# Patient Record
Sex: FEMALE | Race: WHITE | NOT HISPANIC OR LATINO | URBAN - METROPOLITAN AREA
[De-identification: names, ages, dates, MRNs, and addresses within clinical notes are randomized per-mention and may not be internally consistent; named-entity substitution may affect disease eponyms.]

---

## 2017-01-09 ENCOUNTER — OUTPATIENT (OUTPATIENT)
Dept: OUTPATIENT SERVICES | Facility: HOSPITAL | Age: 47
LOS: 1 days | End: 2017-01-09
Payer: COMMERCIAL

## 2017-01-09 VITALS
RESPIRATION RATE: 16 BRPM | DIASTOLIC BLOOD PRESSURE: 78 MMHG | SYSTOLIC BLOOD PRESSURE: 125 MMHG | HEIGHT: 65 IN | WEIGHT: 154.1 LBS | TEMPERATURE: 98 F

## 2017-01-09 DIAGNOSIS — Z98.891 HISTORY OF UTERINE SCAR FROM PREVIOUS SURGERY: Chronic | ICD-10-CM

## 2017-01-09 DIAGNOSIS — K82.8 OTHER SPECIFIED DISEASES OF GALLBLADDER: ICD-10-CM

## 2017-01-09 DIAGNOSIS — E10.9 TYPE 1 DIABETES MELLITUS WITHOUT COMPLICATIONS: ICD-10-CM

## 2017-01-09 DIAGNOSIS — Z01.818 ENCOUNTER FOR OTHER PREPROCEDURAL EXAMINATION: ICD-10-CM

## 2017-01-09 DIAGNOSIS — K82.9 DISEASE OF GALLBLADDER, UNSPECIFIED: ICD-10-CM

## 2017-01-09 LAB
ALBUMIN SERPL ELPH-MCNC: 3.6 G/DL — SIGNIFICANT CHANGE UP (ref 3.3–5)
ALP SERPL-CCNC: 92 U/L — SIGNIFICANT CHANGE UP (ref 40–120)
ALT FLD-CCNC: 20 U/L — SIGNIFICANT CHANGE UP (ref 12–78)
ANION GAP SERPL CALC-SCNC: 7 MMOL/L — SIGNIFICANT CHANGE UP (ref 5–17)
AST SERPL-CCNC: 16 U/L — SIGNIFICANT CHANGE UP (ref 15–37)
BILIRUB SERPL-MCNC: 0.3 MG/DL — SIGNIFICANT CHANGE UP (ref 0.2–1.2)
BUN SERPL-MCNC: 9 MG/DL — SIGNIFICANT CHANGE UP (ref 7–23)
CALCIUM SERPL-MCNC: 8.6 MG/DL — SIGNIFICANT CHANGE UP (ref 8.5–10.1)
CHLORIDE SERPL-SCNC: 107 MMOL/L — SIGNIFICANT CHANGE UP (ref 96–108)
CO2 SERPL-SCNC: 28 MMOL/L — SIGNIFICANT CHANGE UP (ref 22–31)
CREAT SERPL-MCNC: 0.66 MG/DL — SIGNIFICANT CHANGE UP (ref 0.5–1.3)
GLUCOSE SERPL-MCNC: 94 MG/DL — SIGNIFICANT CHANGE UP (ref 70–99)
HBA1C BLD-MCNC: 7.9 % — HIGH (ref 4–5.6)
HCG SERPL-ACNC: <1 MIU/ML — SIGNIFICANT CHANGE UP
HCT VFR BLD CALC: 40.5 % — SIGNIFICANT CHANGE UP (ref 34.5–45)
HGB BLD-MCNC: 13.8 G/DL — SIGNIFICANT CHANGE UP (ref 11.5–15.5)
MCHC RBC-ENTMCNC: 30.2 PG — SIGNIFICANT CHANGE UP (ref 27–34)
MCHC RBC-ENTMCNC: 34.2 GM/DL — SIGNIFICANT CHANGE UP (ref 32–36)
MCV RBC AUTO: 88.3 FL — SIGNIFICANT CHANGE UP (ref 80–100)
PLATELET # BLD AUTO: 327 K/UL — SIGNIFICANT CHANGE UP (ref 150–400)
POTASSIUM SERPL-MCNC: 4 MMOL/L — SIGNIFICANT CHANGE UP (ref 3.5–5.3)
POTASSIUM SERPL-SCNC: 4 MMOL/L — SIGNIFICANT CHANGE UP (ref 3.5–5.3)
PROT SERPL-MCNC: 7.2 G/DL — SIGNIFICANT CHANGE UP (ref 6–8.3)
RBC # BLD: 4.59 M/UL — SIGNIFICANT CHANGE UP (ref 3.8–5.2)
RBC # FLD: 11.4 % — SIGNIFICANT CHANGE UP (ref 10.3–14.5)
SODIUM SERPL-SCNC: 142 MMOL/L — SIGNIFICANT CHANGE UP (ref 135–145)
WBC # BLD: 6.9 K/UL — SIGNIFICANT CHANGE UP (ref 3.8–10.5)
WBC # FLD AUTO: 6.9 K/UL — SIGNIFICANT CHANGE UP (ref 3.8–10.5)

## 2017-01-09 PROCEDURE — 93005 ELECTROCARDIOGRAM TRACING: CPT

## 2017-01-09 PROCEDURE — 93010 ELECTROCARDIOGRAM REPORT: CPT

## 2017-01-09 PROCEDURE — 85027 COMPLETE CBC AUTOMATED: CPT

## 2017-01-09 PROCEDURE — 84702 CHORIONIC GONADOTROPIN TEST: CPT

## 2017-01-09 PROCEDURE — 80053 COMPREHEN METABOLIC PANEL: CPT

## 2017-01-09 PROCEDURE — 86850 RBC ANTIBODY SCREEN: CPT

## 2017-01-09 PROCEDURE — 83036 HEMOGLOBIN GLYCOSYLATED A1C: CPT

## 2017-01-09 PROCEDURE — 86901 BLOOD TYPING SEROLOGIC RH(D): CPT

## 2017-01-09 PROCEDURE — 86900 BLOOD TYPING SEROLOGIC ABO: CPT

## 2017-01-09 PROCEDURE — G0463: CPT

## 2017-01-09 RX ORDER — INSULIN LISPRO 100/ML
0 VIAL (ML) SUBCUTANEOUS
Qty: 0 | Refills: 0 | COMMUNITY

## 2017-01-09 NOTE — H&P PST ADULT - PROBLEM SELECTOR PLAN 2
Finger stick on admit, monitor Blood glucose closely. pt on insulin pump, advised to follow instructions by Endocrinologist.

## 2017-01-09 NOTE — H&P PST ADULT - RS GEN PE MLT RESP DETAILS PC
good air movement/breath sounds equal/clear to auscultation bilaterally/airway patent/respirations non-labored/normal

## 2017-01-09 NOTE — H&P PST ADULT - HISTORY OF PRESENT ILLNESS
45 y/o female with h/o T1DM on insulin pump, known diabetic for about 30 yrs. have known to have gall bladder issues for couple of yrs, recent US showed some polyp. Denies N/V/D/C. Was advised to have laparoscopic cholecystectomy.

## 2017-01-09 NOTE — H&P PST ADULT - PMH
Type 1 diabetes mellitus Type 1 diabetes mellitus  1/19/16 Spoke with patient. Patient plans to reduce basal rates by 25% as per endocrine clearance then night before procedure. Insulin pump orders written. Note:  1 unit will lower this patient 50 mg/dL (ISF-Insulin sensitivity factor)

## 2017-01-09 NOTE — H&P PST ADULT - NSANTHOSAYNRD_GEN_A_CORE
No. BRADEN screening performed.  STOP BANG Legend: 0-2 = LOW Risk; 3-4 = INTERMEDIATE Risk; 5-8 = HIGH Risk

## 2017-01-09 NOTE — H&P PST ADULT - ASSESSMENT
47 y/o female with gall bladder disease , scheduled for laparoscopic cholecystectomy. pre op testing today. medical eval and endocrine eval advised.

## 2017-01-13 ENCOUNTER — APPOINTMENT (OUTPATIENT)
Dept: SURGERY | Facility: HOSPITAL | Age: 47
End: 2017-01-13

## 2017-01-19 RX ORDER — INSULIN LISPRO 100/ML
1 VIAL (ML) SUBCUTANEOUS
Qty: 0 | Refills: 0 | Status: DISCONTINUED | OUTPATIENT
Start: 2017-01-23 | End: 2017-02-07

## 2017-01-22 ENCOUNTER — RESULT REVIEW (OUTPATIENT)
Age: 47
End: 2017-01-22

## 2017-01-23 ENCOUNTER — OUTPATIENT (OUTPATIENT)
Dept: OUTPATIENT SERVICES | Facility: HOSPITAL | Age: 47
LOS: 1 days | Discharge: HOME | End: 2017-01-23
Payer: COMMERCIAL

## 2017-01-23 VITALS
DIASTOLIC BLOOD PRESSURE: 58 MMHG | HEART RATE: 69 BPM | RESPIRATION RATE: 16 BRPM | WEIGHT: 154.1 LBS | OXYGEN SATURATION: 98 % | TEMPERATURE: 98 F | SYSTOLIC BLOOD PRESSURE: 100 MMHG | HEIGHT: 65 IN

## 2017-01-23 VITALS
SYSTOLIC BLOOD PRESSURE: 101 MMHG | HEART RATE: 62 BPM | RESPIRATION RATE: 16 BRPM | OXYGEN SATURATION: 98 % | DIASTOLIC BLOOD PRESSURE: 54 MMHG

## 2017-01-23 DIAGNOSIS — K82.8 OTHER SPECIFIED DISEASES OF GALLBLADDER: ICD-10-CM

## 2017-01-23 DIAGNOSIS — Z98.891 HISTORY OF UTERINE SCAR FROM PREVIOUS SURGERY: Chronic | ICD-10-CM

## 2017-01-23 DIAGNOSIS — Z01.818 ENCOUNTER FOR OTHER PREPROCEDURAL EXAMINATION: ICD-10-CM

## 2017-01-23 LAB — HCG UR QL: NEGATIVE — SIGNIFICANT CHANGE UP

## 2017-01-23 PROCEDURE — 81025 URINE PREGNANCY TEST: CPT

## 2017-01-23 PROCEDURE — 88304 TISSUE EXAM BY PATHOLOGIST: CPT

## 2017-01-23 PROCEDURE — 88304 TISSUE EXAM BY PATHOLOGIST: CPT | Mod: 26

## 2017-01-23 PROCEDURE — 47562 LAPAROSCOPIC CHOLECYSTECTOMY: CPT

## 2017-01-23 RX ORDER — HYDROMORPHONE HYDROCHLORIDE 2 MG/ML
0.5 INJECTION INTRAMUSCULAR; INTRAVENOUS; SUBCUTANEOUS
Qty: 0 | Refills: 0 | Status: DISCONTINUED | OUTPATIENT
Start: 2017-01-23 | End: 2017-01-23

## 2017-01-23 RX ORDER — SODIUM CHLORIDE 9 MG/ML
1000 INJECTION, SOLUTION INTRAVENOUS
Qty: 0 | Refills: 0 | Status: DISCONTINUED | OUTPATIENT
Start: 2017-01-23 | End: 2017-01-23

## 2017-01-23 RX ORDER — INSULIN LISPRO 100/ML
0 VIAL (ML) SUBCUTANEOUS
Qty: 0 | Refills: 0 | COMMUNITY

## 2017-01-23 RX ORDER — ONDANSETRON 8 MG/1
4 TABLET, FILM COATED ORAL ONCE
Qty: 0 | Refills: 0 | Status: DISCONTINUED | OUTPATIENT
Start: 2017-01-23 | End: 2017-01-23

## 2017-01-23 RX ADMIN — HYDROMORPHONE HYDROCHLORIDE 0.5 MILLIGRAM(S): 2 INJECTION INTRAMUSCULAR; INTRAVENOUS; SUBCUTANEOUS at 09:51

## 2017-01-23 RX ADMIN — SODIUM CHLORIDE 100 MILLILITER(S): 9 INJECTION, SOLUTION INTRAVENOUS at 09:06

## 2017-01-23 RX ADMIN — HYDROMORPHONE HYDROCHLORIDE 0.5 MILLIGRAM(S): 2 INJECTION INTRAMUSCULAR; INTRAVENOUS; SUBCUTANEOUS at 09:14

## 2017-01-23 NOTE — BRIEF OPERATIVE NOTE - PROCEDURE
Umbilical hernia repair  01/23/2017    Active  BKECK  Laparoscopic cholecystectomy  01/23/2017    Active  BKECK

## 2017-01-23 NOTE — BRIEF OPERATIVE NOTE - PRE-OP DX
Gallbladder polyp  01/23/2017    Active  Alden Armstrong  Umbilical hernia without obstruction and without gangrene  01/23/2017    Active  Aldne Armstrong

## 2017-01-23 NOTE — ASU DISCHARGE PLAN (ADULT/PEDIATRIC). - MEDICATION SUMMARY - MEDICATIONS TO TAKE
I will START or STAY ON the medications listed below when I get home from the hospital:    oxyCODONE-acetaminophen 5mg-325mg oral tablet  -- 1 tab(s) by mouth every 6 hours MDD:4  -- Caution federal law prohibits the transfer of this drug to any person other  than the person for whom it was prescribed.  May cause drowsiness.  Alcohol may intensify this effect.  Use care when operating dangerous machinery.  This prescription cannot be refilled.  This product contains acetaminophen.  Do not use  with any other product containing acetaminophen to prevent possible liver damage.  Using more of this medication than prescribed may cause serious breathing problems.    -- Indication: For pain    HumaLOG Cartridge  --  subcutaneous   Insulin PUmp  -- Indication: For home med I will START or STAY ON the medications listed below when I get home from the hospital:    oxyCODONE-acetaminophen 5mg-325mg oral tablet  -- 1 tab(s) by mouth every 6 hours MDD:4  -- Caution federal law prohibits the transfer of this drug to any person other  than the person for whom it was prescribed.  May cause drowsiness.  Alcohol may intensify this effect.  Use care when operating dangerous machinery.  This prescription cannot be refilled.  This product contains acetaminophen.  Do not use  with any other product containing acetaminophen to prevent possible liver damage.  Using more of this medication than prescribed may cause serious breathing problems.    -- Indication: For pain    HumaLOG Cartridge  --  subcutaneous   Insulin PUmp  -- Indication: For home med    Cleocin HCl 300 mg oral capsule  -- 1 cap(s) by mouth every 6 hours  -- Finish all this medication unless otherwise directed by prescriber.  Medication should be taken with plenty of water.    -- Indication: For antibiotics

## 2017-01-23 NOTE — BRIEF OPERATIVE NOTE - POST-OP DX
Gallbladder polyp  01/23/2017    Active  Alden Armstrong  Umbilical hernia without obstruction and without gangrene  01/23/2017    Active  Alden Armstrong

## 2017-01-23 NOTE — ASU DISCHARGE PLAN (ADULT/PEDIATRIC). - NOTIFY
Fever greater than 101 Persistent Nausea and Vomiting/Swelling that continues/Unable to Urinate/Bleeding that does not stop/Fever greater than 101/Inability to Tolerate Liquids or Foods

## 2017-01-24 LAB — SURGICAL PATHOLOGY FINAL REPORT - CH: SIGNIFICANT CHANGE UP

## 2017-01-26 DIAGNOSIS — K80.10 CALCULUS OF GALLBLADDER WITH CHRONIC CHOLECYSTITIS WITHOUT OBSTRUCTION: ICD-10-CM

## 2017-01-26 DIAGNOSIS — Z96.41 PRESENCE OF INSULIN PUMP (EXTERNAL) (INTERNAL): ICD-10-CM

## 2017-01-26 DIAGNOSIS — K82.4 CHOLESTEROLOSIS OF GALLBLADDER: ICD-10-CM

## 2017-01-26 DIAGNOSIS — Z79.4 LONG TERM (CURRENT) USE OF INSULIN: ICD-10-CM

## 2017-01-26 DIAGNOSIS — E10.9 TYPE 1 DIABETES MELLITUS WITHOUT COMPLICATIONS: ICD-10-CM

## 2017-06-21 PROBLEM — E10.9 TYPE 1 DIABETES MELLITUS WITHOUT COMPLICATIONS: Chronic | Status: ACTIVE | Noted: 2017-01-09

## 2017-06-27 ENCOUNTER — APPOINTMENT (OUTPATIENT)
Dept: OBGYN | Facility: CLINIC | Age: 47
End: 2017-06-27

## 2017-06-27 VITALS
BODY MASS INDEX: 24.99 KG/M2 | HEIGHT: 65 IN | DIASTOLIC BLOOD PRESSURE: 82 MMHG | WEIGHT: 150 LBS | SYSTOLIC BLOOD PRESSURE: 126 MMHG

## 2017-06-27 DIAGNOSIS — Z01.419 ENCOUNTER FOR GYNECOLOGICAL EXAMINATION (GENERAL) (ROUTINE) W/OUT ABNORMAL FINDINGS: ICD-10-CM

## 2017-06-28 ENCOUNTER — APPOINTMENT (OUTPATIENT)
Dept: OBGYN | Facility: CLINIC | Age: 47
End: 2017-06-28

## 2017-06-29 LAB — HPV HIGH+LOW RISK DNA PNL CVX: NEGATIVE

## 2017-07-02 LAB — CYTOLOGY CVX/VAG DOC THIN PREP: NORMAL

## 2017-07-07 ENCOUNTER — APPOINTMENT (OUTPATIENT)
Dept: OBGYN | Facility: CLINIC | Age: 47
End: 2017-07-07

## 2018-12-02 NOTE — ASU DISCHARGE PLAN (ADULT/PEDIATRIC). - ITEMS TO FOLLOWUP WITH YOUR PHYSICIAN'S
pr cleared to go to  as per MD Chu. rpt given to ZBIGNIEW Talley. pt is calm and cooperative at this time. awaiting further orders Will continue to monitor the pt call for appt in 1 week

## 2020-09-24 ENCOUNTER — APPOINTMENT (OUTPATIENT)
Dept: OTOLARYNGOLOGY | Facility: CLINIC | Age: 50
End: 2020-09-24
Payer: COMMERCIAL

## 2020-09-24 PROCEDURE — 92585: CPT

## 2020-09-29 ENCOUNTER — RECORD ABSTRACTING (OUTPATIENT)
Age: 50
End: 2020-09-29

## 2020-09-29 DIAGNOSIS — E10.9 TYPE 1 DIABETES MELLITUS W/OUT COMPLICATIONS: ICD-10-CM

## 2020-09-29 DIAGNOSIS — M26.621 ARTHRALGIA OF RIGHT TEMPOROMANDIBULAR JOINT: ICD-10-CM

## 2020-10-01 ENCOUNTER — APPOINTMENT (OUTPATIENT)
Dept: OTOLARYNGOLOGY | Facility: CLINIC | Age: 50
End: 2020-10-01

## 2020-10-06 ENCOUNTER — APPOINTMENT (OUTPATIENT)
Dept: OTOLARYNGOLOGY | Facility: CLINIC | Age: 50
End: 2020-10-06
Payer: COMMERCIAL

## 2020-10-06 VITALS
BODY MASS INDEX: 24.99 KG/M2 | DIASTOLIC BLOOD PRESSURE: 74 MMHG | SYSTOLIC BLOOD PRESSURE: 110 MMHG | WEIGHT: 150 LBS | HEIGHT: 65 IN | TEMPERATURE: 97.1 F | HEART RATE: 75 BPM

## 2020-10-06 DIAGNOSIS — R51.9 HEADACHE, UNSPECIFIED: ICD-10-CM

## 2020-10-06 DIAGNOSIS — M26.609 UNSPECIFIED TEMPOROMANDIBULAR JOINT DISORDER: ICD-10-CM

## 2020-10-06 PROCEDURE — 99213 OFFICE O/P EST LOW 20 MIN: CPT

## 2020-10-06 NOTE — HISTORY OF PRESENT ILLNESS
[de-identified] : 50 yr old female w aymmetrical SNHL AS worse. ABR abnl AD.  All summer had HA, MRI WNL as per pt. Now on topomax, HA are improved.\par -tinnitus, dizzy\par +hx TMJ

## 2020-10-06 NOTE — ASSESSMENT
[FreeTextEntry1] : HA due to migraine, improving w Topomax\par No MRI cause for asym HL or abnl ABR\par warm soaks, soft diet, NSAID, DDS eval for TMJ\par f/u 6 months

## 2021-01-06 ENCOUNTER — TRANSCRIPTION ENCOUNTER (OUTPATIENT)
Age: 51
End: 2021-01-06

## 2021-04-12 ENCOUNTER — APPOINTMENT (OUTPATIENT)
Dept: OTOLARYNGOLOGY | Facility: CLINIC | Age: 51
End: 2021-04-12
Payer: COMMERCIAL

## 2021-04-12 VITALS
WEIGHT: 140 LBS | TEMPERATURE: 97.2 F | HEIGHT: 65 IN | BODY MASS INDEX: 23.32 KG/M2 | SYSTOLIC BLOOD PRESSURE: 89 MMHG | DIASTOLIC BLOOD PRESSURE: 56 MMHG | HEART RATE: 70 BPM

## 2021-04-12 DIAGNOSIS — H90.42 SENSORINEURAL HEARING LOSS, UNILATERAL, LEFT EAR, WITH UNRESTRICTED HEARING ON THE CONTRALATERAL SIDE: ICD-10-CM

## 2021-04-12 PROCEDURE — 99213 OFFICE O/P EST LOW 20 MIN: CPT

## 2021-04-12 PROCEDURE — 92550 TYMPANOMETRY & REFLEX THRESH: CPT

## 2021-04-12 PROCEDURE — 99072 ADDL SUPL MATRL&STAF TM PHE: CPT

## 2021-04-12 PROCEDURE — 92557 COMPREHENSIVE HEARING TEST: CPT

## 2021-04-12 NOTE — REVIEW OF SYSTEMS
[Seasonal Allergies] : seasonal allergies [As Noted in HPI] : as noted in HPI [Throat Dryness] : throat dryness [Negative] : Nasal

## 2021-04-12 NOTE — ASSESSMENT
[FreeTextEntry1] : no subjective change in hearing\par \par  WNL AU with mild SN notch 3-4KHz AS w type A AU, no change from  2020\par annual audio

## 2021-04-12 NOTE — HISTORY OF PRESENT ILLNESS
[de-identified] : 50 yr old female w aymmetrical SNHL AS worse. ABR abnl AD.  All summer had HA, MRI WNL as per pt. Now on topomax, HA are improved.\par -tinnitus, dizzy\par +hx TMJ\par \par new dx of GERD tx w pantoprazole bid

## 2021-06-23 NOTE — ASU PATIENT PROFILE, ADULT - TEACHING/LEARNING FACTORS IMPACT ABILITY TO LEARN
[email or web address]- Please rest for 24 hours, no strenuous activity. Resume normal activities after 24 hours.  - No soaking in bath tubs, hot tubs, or swimming pools for 24 hours  - You may shower in the morning.  - Keep your band aid in place for 24 hours.    NO DRIVING FOR 4 hours post procedure.    - Immediately following the procedure, it is possible that your legs may feel shaky or weak. These sensations are temporary. Please alert the staff if this occurs,as we do not want you to fall. Even though you may feel strong enough, the numbing medicine is much stronger and you risk falling and injuring yourself further.  - Tenderness at the site of the injection is possible but usually minimal. If the pain is too bothersome, take anti-inflammatory medications or acetaminophen (both sold over-the- counter). A cold compress can be used for 15 minutes intervals 1-2 hours as needed. Do not use heat for the first 24 hours.  - If you were asked to hold certain medications for the procedure, you may restart today, unless advised otherwise.    If you experience:    ~Fever, chills, swelling,or drainage from the injection site    ~Severe arm/leg weakness, severe headache that is worse when standing up    ~New/sudden changes in urination/bowel control(incontinence)    ~Worsening pain several days after the procedure   You should call the clinic immediately or go to the nearest Emergency Department for evaluation    ++If unable to reach your physician,call 911 or go to the nearest hospital Emergency Department++    You were given \"homework\" today.  Please use handout and call clinic as instructed with your pain levels. [phone]  #2.   none

## 2021-12-31 ENCOUNTER — TRANSCRIPTION ENCOUNTER (OUTPATIENT)
Age: 51
End: 2021-12-31

## 2022-04-11 ENCOUNTER — APPOINTMENT (OUTPATIENT)
Dept: OTOLARYNGOLOGY | Facility: CLINIC | Age: 52
End: 2022-04-11

## 2022-07-20 ENCOUNTER — NON-APPOINTMENT (OUTPATIENT)
Age: 52
End: 2022-07-20

## 2023-04-15 ENCOUNTER — NON-APPOINTMENT (OUTPATIENT)
Age: 53
End: 2023-04-15

## 2024-05-27 ENCOUNTER — EMERGENCY (EMERGENCY)
Facility: HOSPITAL | Age: 54
LOS: 1 days | Discharge: HOME | End: 2024-05-27
Attending: EMERGENCY MEDICINE
Payer: COMMERCIAL

## 2024-05-27 ENCOUNTER — TRANSCRIPTION ENCOUNTER (OUTPATIENT)
Age: 54
End: 2024-05-27

## 2024-05-27 VITALS
HEART RATE: 73 BPM | WEIGHT: 160.06 LBS | HEIGHT: 65 IN | DIASTOLIC BLOOD PRESSURE: 74 MMHG | RESPIRATION RATE: 17 BRPM | SYSTOLIC BLOOD PRESSURE: 109 MMHG | OXYGEN SATURATION: 99 % | TEMPERATURE: 98 F

## 2024-05-27 DIAGNOSIS — Z98.891 HISTORY OF UTERINE SCAR FROM PREVIOUS SURGERY: Chronic | ICD-10-CM

## 2024-05-27 LAB
ADD ON TEST-SPECIMEN IN LAB: SIGNIFICANT CHANGE UP
ADD ON TEST-SPECIMEN IN LAB: SIGNIFICANT CHANGE UP
ALBUMIN SERPL ELPH-MCNC: 4.3 G/DL — SIGNIFICANT CHANGE UP (ref 3.3–5)
ALP SERPL-CCNC: 66 U/L — SIGNIFICANT CHANGE UP (ref 40–120)
ALT FLD-CCNC: 25 U/L — SIGNIFICANT CHANGE UP (ref 10–45)
ANION GAP SERPL CALC-SCNC: 10 MMOL/L — SIGNIFICANT CHANGE UP (ref 5–17)
ANION GAP SERPL CALC-SCNC: 10 MMOL/L — SIGNIFICANT CHANGE UP (ref 5–17)
ANION GAP SERPL CALC-SCNC: 8 MMOL/L — SIGNIFICANT CHANGE UP (ref 5–17)
APTT BLD: 30.7 SEC — SIGNIFICANT CHANGE UP (ref 24.5–35.6)
AST SERPL-CCNC: 57 U/L — HIGH (ref 10–40)
BASE EXCESS BLDV CALC-SCNC: -0.8 MMOL/L — SIGNIFICANT CHANGE UP (ref -2–3)
BASOPHILS # BLD AUTO: 0.04 K/UL — SIGNIFICANT CHANGE UP (ref 0–0.2)
BASOPHILS NFR BLD AUTO: 0.7 % — SIGNIFICANT CHANGE UP (ref 0–2)
BILIRUB SERPL-MCNC: 0.6 MG/DL — SIGNIFICANT CHANGE UP (ref 0.2–1.2)
BUN SERPL-MCNC: 13 MG/DL — SIGNIFICANT CHANGE UP (ref 7–23)
BUN SERPL-MCNC: 13 MG/DL — SIGNIFICANT CHANGE UP (ref 7–23)
BUN SERPL-MCNC: 16 MG/DL — SIGNIFICANT CHANGE UP (ref 7–23)
CA-I SERPL-SCNC: 1.14 MMOL/L — LOW (ref 1.15–1.33)
CALCIUM SERPL-MCNC: 9.1 MG/DL — SIGNIFICANT CHANGE UP (ref 8.4–10.5)
CALCIUM SERPL-MCNC: 9.2 MG/DL — SIGNIFICANT CHANGE UP (ref 8.4–10.5)
CALCIUM SERPL-MCNC: 9.3 MG/DL — SIGNIFICANT CHANGE UP (ref 8.4–10.5)
CHLORIDE BLDV-SCNC: 106 MMOL/L — SIGNIFICANT CHANGE UP (ref 96–108)
CHLORIDE SERPL-SCNC: 104 MMOL/L — SIGNIFICANT CHANGE UP (ref 96–108)
CHLORIDE SERPL-SCNC: 108 MMOL/L — SIGNIFICANT CHANGE UP (ref 96–108)
CHLORIDE SERPL-SCNC: 109 MMOL/L — HIGH (ref 96–108)
CO2 BLDV-SCNC: 26 MMOL/L — SIGNIFICANT CHANGE UP (ref 22–26)
CO2 SERPL-SCNC: 21 MMOL/L — LOW (ref 22–31)
CO2 SERPL-SCNC: 21 MMOL/L — LOW (ref 22–31)
CO2 SERPL-SCNC: 23 MMOL/L — SIGNIFICANT CHANGE UP (ref 22–31)
CREAT SERPL-MCNC: 0.73 MG/DL — SIGNIFICANT CHANGE UP (ref 0.5–1.3)
CREAT SERPL-MCNC: 0.74 MG/DL — SIGNIFICANT CHANGE UP (ref 0.5–1.3)
CREAT SERPL-MCNC: 0.78 MG/DL — SIGNIFICANT CHANGE UP (ref 0.5–1.3)
EGFR: 100 ML/MIN/1.73M2 — SIGNIFICANT CHANGE UP
EGFR: 92 ML/MIN/1.73M2 — SIGNIFICANT CHANGE UP
EGFR: 98 ML/MIN/1.73M2 — SIGNIFICANT CHANGE UP
EOSINOPHIL # BLD AUTO: 0.07 K/UL — SIGNIFICANT CHANGE UP (ref 0–0.5)
EOSINOPHIL NFR BLD AUTO: 1.2 % — SIGNIFICANT CHANGE UP (ref 0–6)
GAS PNL BLDV: 134 MMOL/L — LOW (ref 136–145)
GAS PNL BLDV: SIGNIFICANT CHANGE UP
GAS PNL BLDV: SIGNIFICANT CHANGE UP
GLUCOSE BLDV-MCNC: 96 MG/DL — SIGNIFICANT CHANGE UP (ref 70–99)
GLUCOSE SERPL-MCNC: 102 MG/DL — HIGH (ref 70–99)
GLUCOSE SERPL-MCNC: 105 MG/DL — HIGH (ref 70–99)
GLUCOSE SERPL-MCNC: 129 MG/DL — HIGH (ref 70–99)
HCG SERPL-ACNC: <2 MIU/ML — SIGNIFICANT CHANGE UP
HCO3 BLDV-SCNC: 25 MMOL/L — SIGNIFICANT CHANGE UP (ref 22–29)
HCT VFR BLD CALC: 42.1 % — SIGNIFICANT CHANGE UP (ref 34.5–45)
HCT VFR BLDA CALC: 44 % — SIGNIFICANT CHANGE UP (ref 34.5–46.5)
HGB BLD CALC-MCNC: 14.5 G/DL — SIGNIFICANT CHANGE UP (ref 11.7–16.1)
HGB BLD-MCNC: 14.1 G/DL — SIGNIFICANT CHANGE UP (ref 11.5–15.5)
IMM GRANULOCYTES NFR BLD AUTO: 0.4 % — SIGNIFICANT CHANGE UP (ref 0–0.9)
INR BLD: 0.88 RATIO — SIGNIFICANT CHANGE UP (ref 0.85–1.18)
LACTATE BLDV-MCNC: 1.3 MMOL/L — SIGNIFICANT CHANGE UP (ref 0.5–2)
LYMPHOCYTES # BLD AUTO: 1.91 K/UL — SIGNIFICANT CHANGE UP (ref 1–3.3)
LYMPHOCYTES # BLD AUTO: 33.5 % — SIGNIFICANT CHANGE UP (ref 13–44)
MCHC RBC-ENTMCNC: 30 PG — SIGNIFICANT CHANGE UP (ref 27–34)
MCHC RBC-ENTMCNC: 33.5 GM/DL — SIGNIFICANT CHANGE UP (ref 32–36)
MCV RBC AUTO: 89.6 FL — SIGNIFICANT CHANGE UP (ref 80–100)
MONOCYTES # BLD AUTO: 0.41 K/UL — SIGNIFICANT CHANGE UP (ref 0–0.9)
MONOCYTES NFR BLD AUTO: 7.2 % — SIGNIFICANT CHANGE UP (ref 2–14)
NEUTROPHILS # BLD AUTO: 3.25 K/UL — SIGNIFICANT CHANGE UP (ref 1.8–7.4)
NEUTROPHILS NFR BLD AUTO: 57 % — SIGNIFICANT CHANGE UP (ref 43–77)
NRBC # BLD: 0 /100 WBCS — SIGNIFICANT CHANGE UP (ref 0–0)
NT-PROBNP SERPL-SCNC: 230 PG/ML — SIGNIFICANT CHANGE UP (ref 0–300)
PCO2 BLDV: 44 MMHG — HIGH (ref 39–42)
PH BLDV: 7.36 — SIGNIFICANT CHANGE UP (ref 7.32–7.43)
PLATELET # BLD AUTO: 311 K/UL — SIGNIFICANT CHANGE UP (ref 150–400)
PO2 BLDV: 35 MMHG — SIGNIFICANT CHANGE UP (ref 25–45)
POTASSIUM BLDV-SCNC: 6.8 MMOL/L — CRITICAL HIGH (ref 3.5–5.1)
POTASSIUM SERPL-MCNC: 3.9 MMOL/L — SIGNIFICANT CHANGE UP (ref 3.5–5.3)
POTASSIUM SERPL-MCNC: 5.1 MMOL/L — SIGNIFICANT CHANGE UP (ref 3.5–5.3)
POTASSIUM SERPL-MCNC: 6.2 MMOL/L — CRITICAL HIGH (ref 3.5–5.3)
POTASSIUM SERPL-SCNC: 3.9 MMOL/L — SIGNIFICANT CHANGE UP (ref 3.5–5.3)
POTASSIUM SERPL-SCNC: 5.1 MMOL/L — SIGNIFICANT CHANGE UP (ref 3.5–5.3)
POTASSIUM SERPL-SCNC: 6.2 MMOL/L — CRITICAL HIGH (ref 3.5–5.3)
PROT SERPL-MCNC: 7.5 G/DL — SIGNIFICANT CHANGE UP (ref 6–8.3)
PROTHROM AB SERPL-ACNC: 9.7 SEC — SIGNIFICANT CHANGE UP (ref 9.5–13)
RBC # BLD: 4.7 M/UL — SIGNIFICANT CHANGE UP (ref 3.8–5.2)
RBC # FLD: 12.6 % — SIGNIFICANT CHANGE UP (ref 10.3–14.5)
SAO2 % BLDV: 57.5 % — LOW (ref 67–88)
SODIUM SERPL-SCNC: 135 MMOL/L — SIGNIFICANT CHANGE UP (ref 135–145)
SODIUM SERPL-SCNC: 139 MMOL/L — SIGNIFICANT CHANGE UP (ref 135–145)
SODIUM SERPL-SCNC: 140 MMOL/L — SIGNIFICANT CHANGE UP (ref 135–145)
TROPONIN T, HIGH SENSITIVITY RESULT: <6 NG/L — SIGNIFICANT CHANGE UP (ref 0–51)
WBC # BLD: 5.7 K/UL — SIGNIFICANT CHANGE UP (ref 3.8–10.5)
WBC # FLD AUTO: 5.7 K/UL — SIGNIFICANT CHANGE UP (ref 3.8–10.5)

## 2024-05-27 PROCEDURE — 99223 1ST HOSP IP/OBS HIGH 75: CPT

## 2024-05-27 PROCEDURE — 71046 X-RAY EXAM CHEST 2 VIEWS: CPT | Mod: 26

## 2024-05-27 NOTE — ED PROVIDER NOTE - OBJECTIVE STATEMENT
53-year-old female with past medical history of diabetes on insulin, presenting to the ED with shortness of breath for the past week and chest comfort that started today.  Patient states that she has been having exertional dyspnea has been going on for a week.  Patient also reports chest tightness that started today.  Denies any nausea, vomiting, fever, chills, cough, abdominal pain, urinary complaints.  Patient does have family history of MI.  Denies any smoking.

## 2024-05-27 NOTE — ED CDU PROVIDER DISPOSITION NOTE - PATIENT PORTAL LINK FT
You can access the FollowMyHealth Patient Portal offered by [facility] by registering at the following website: [email or web address]. By joining [facility]’s FollowMyHealth portal, you will also be able to view your health information using other applications (apps) compatible with our system.

## 2024-05-27 NOTE — ED PROVIDER NOTE - PROGRESS NOTE DETAILS
MD Viola (PGY-2) patient's labs and imaging reviewed.  No acute findings.  Plan to have patient be placed in CDU for echo and stress.

## 2024-05-27 NOTE — ED ADULT TRIAGE NOTE - CHIEF COMPLAINT QUOTE
pt c/o sob at rest and exertion x 1 week and chest discomfort since today  pt denies additional symptoms

## 2024-05-27 NOTE — ED CDU PROVIDER INITIAL DAY NOTE - CLINICAL SUMMARY MEDICAL DECISION MAKING FREE TEXT BOX
INGRID Oliver MD: 54 yo F with a PMH of IDDM on pump p/w shortness of breath for the past week and chest comfort that started today.  Patient states that she has been having exertional dyspnea has been going on for a week. Today she began to experience diffuse chest tightness while at rest. Concerned that her father has had prior stents. Pt herself has no hx of CAD/MI. Denies nausea, vomiting, fever, chills, palpitations, leg swelling, calf pain, headache, dizziness. Pt sent to CDU for CTCA, echo, cardiac monitoring.

## 2024-05-27 NOTE — ED CDU PROVIDER DISPOSITION NOTE - CARE PROVIDER_API CALL
Anibal Herrera  Cardiovascular Disease  [address], [address]  [place] [address]  Phone: [phone]  Fax: [phone]  Follow Up Time: 4-6 Days

## 2024-05-27 NOTE — ED CDU PROVIDER DISPOSITION NOTE - NSFOLLOWUPINSTRUCTIONS_ED_ALL_ED_FT
Please make sure to follow up with your primary care doctor within 1-2 days and with the CARDIOLOGY specialist. The information for follow up can be found below. Bring a copy of all of your results with you to your follow up appointments.   Return to the ER as discussed if you develop any new or worsening symptoms.       CARDIOLOGY: Follow-up with your primary care doctor within the next 2-3 days for re-evaluation. Please bring a copy of all results with you to your appointments.     Additionally recommend follow-up with your cardiologist or cardiologist Dr. Herrera within 1 week of discharge.  Please call to schedule appointment.    Anibal Herrera  Cardiovascular Disease  [address], [address]  [place] [address]  Phone: [phone]  Follow Up Time: 4-6 Days    Additionally continue outpatient follow-up with your endocrinologist for continued management regarding your insulin pump.  Resume insulin pump on discharge as previously advised/prescribed.    Rest, stay hydrated.  Continue current home medications as previously prescribed.    Return the Emergency Department immediately if you develop any new/worsening symptoms including but not limited to worsening chest pain/difficulty breathing, vomiting, abdominal pain, weakness or any other concerns.

## 2024-05-27 NOTE — ED CDU PROVIDER INITIAL DAY NOTE - OBJECTIVE STATEMENT
52 yo F with a PMH of IDDM on pump p/w shortness of breath for the past week and chest comfort that started today.  Patient states that she has been having exertional dyspnea has been going on for a week. Today she began to experience diffuse chest tightness while at rest. Concerned that her father has had prior stents. Pt herself has no hx of CAD/MI. Denies nausea, vomiting, fever, chills, palpitations, leg swelling, calf pain, headache, dizziness.

## 2024-05-27 NOTE — ED ADULT NURSE NOTE - NSICDXPASTMEDICALHX_GEN_ALL_CORE_FT
PAST MEDICAL HISTORY:  Type 1 diabetes mellitus 1/19/16 Spoke with patient. Patient plans to reduce basal rates by 25% as per endocrine clearance then night before procedure. Insulin pump orders written. Note:  1 unit will lower this patient 50 mg/dL (ISF-Insulin sensitivity factor)

## 2024-05-27 NOTE — ED CDU PROVIDER INITIAL DAY NOTE - PROGRESS NOTE DETAILS
CDU PROGRESS NOTE ZACHERY MARIANO: Received pt at 1900 sign-out. Case/plan reviewed. Pt resting in stretcher in NAD. VSS. Pt ambulatory around unit with steady gait. S1 S2 noted, RRR, lungs CTA b/l, BS x4 with soft, nontender abdomen. Pt without complaints. Will continue to monitor overnight. Endocrine consulted for T1DM and insulin pump. Hypoglycemia protocol. In case of pump malfunction, please administer Lantus 12 units STAT and start Admelog 5 units TID with meals    - Please check FSG before meals CDU PROGRESS NOTE ZACHERY MARIANO: Received pt at 1900 sign-out. Case/plan reviewed. Pt resting in stretcher in NAD. VSS. Pt ambulatory around unit with steady gait. S1 S2 noted, RRR, lungs CTA b/l, BS x4 with soft, nontender abdomen. Hst <6, REPEAT pending. Will continue to monitor overnight. CDU PROGRESS NOTE PA MONTSERRAT: Repeat HsT <6 Endocrine consulted for T1DM and insulin pump. Hypoglycemia protocol. In case of pump malfunction, please administer Lantus 12 units STAT and start Admelog 5 units TID with meals . Attestation signed and in chart.

## 2024-05-27 NOTE — CHART NOTE - NSCHARTNOTEFT_GEN_A_CORE
53-year-old female with past medical history of T1DM presenting to the ED with shortness of breath for the past week and chest comfort that started today. Endocrine consulted for T1DM and insulin pump.    --Type of DM: 1   --Diagnosed at age: 1986   --Endocrinologist: Dr. Janina Vela   --Type of pump: Omnipod 5 with automode   --Type of Insulin used in pump: Fiasp U-100   --CGM? Dexcom G6   --Is pt comfortable on pump while in the hospital? Yes   --Last time pump was changed? 5/25/24 - next due to change is 5/28/24   --Do you have supplies? Not at the moment   --Any plans for surgery? Denies       Recommendations:   - Given patient’s glucose is at target and patient is AOx4 and feels comfortable using the pump, patient can continue with insulin pump while overnight   - Please make sure patient fills out patient attestation and patient self-assessment form to use insulin pump and place in chart (forms can be found on forms on demand)   - last pump change 5/25/24 - next due to change is 5/28/24   - RN to document correction insulin bolus in flow sheet   - Hypoglcyemia protocol    - In case of pump malfunction, please administer Lantus 12 units STAT and start Admelog 5 units TID with meals    - Please check FSG before meals and QHS, or q6h while NPO   - Please keep patient on a diabetic, carb controlled diet    - please check HbA1c   - on discharge patient should follow up with their endocrinologist Dr. Janina Vela         Discussed recommendations with primary team.    Jose Angel Faye MD  Endocrine Fellow  Can be reached via [employer] teams.    For follow up questions, discharge recommendations, or new consults, please email [email or web address] ([email or web address]) or [email or web address] ([facility]) or call answering service at [phone] (weekdays); [phone] (nights/weekends).  For emergencies please page fellow on call. 53-year-old female with past medical history of T1DM presenting to the ED with shortness of breath for the past week and chest comfort that started today. Endocrine consulted for T1DM and insulin pump.    --Type of DM: 1   --Diagnosed at age: 1986   --Endocrinologist: Dr. Janina Vela   --Type of pump: Omnipod 5 with automode   --Type of Insulin used in pump: Fiasp U-100   --CGM? Dexcom G6   --Is pt comfortable on pump while in the hospital? Yes   --Last time pump was changed? 5/25/24 - next due to change is 5/28/24   --Do you have supplies? Not at the moment   --Any plans for surgery? Denies       Recommendations:   - Given patient’s glucose is at target and patient is AOx4 and feels comfortable using the pump, patient can continue with insulin pump while overnight   - Please make sure patient fills out patient attestation and patient self-assessment form to use insulin pump and place in chart (forms can be found on forms on demand)   - last pump change 5/25/24 - next due to change is 5/28/24   - RN to document correction insulin bolus in flow sheet   - Hypoglcyemia protocol    - In case of pump malfunction, please administer Lantus 12 units STAT and start Admelog 5 units TID with meals    - Please check FSG before meals and QHS, or q6h while NPO   - Please keep patient on a diabetic, carb controlled diet    - please check HbA1c   - on discharge patient should follow up with their endocrinologist Dr. Janina Vela       Full consult to follow in AM    Discussed recommendations with primary team.    Jose Angel Faye MD  Endocrine Fellow  Can be reached via [employer] teams.    For follow up questions, discharge recommendations, or new consults, please email [email or web address] ([email or web address]) or [email or web address] ([facility]) or call answering service at [phone] (weekdays); [phone] (nights/weekends).  For emergencies please page fellow on call.

## 2024-05-27 NOTE — ED PROVIDER NOTE - CLINICAL SUMMARY MEDICAL DECISION MAKING FREE TEXT BOX
53-year-old female with past medical history of diabetes on insulin, presenting to the ED with shortness of breath for the past week and chest comfort that started today.  Hemodynamically stable.  Physical exam with heart regular rate and rhythm, lungs clear to auscultation, abdomen soft nondistended nontender.  Concern for ACS, low suspicion for possible PE.  Will get labs including CBC, CMP, coags, D-dimer, troponin, ECG, BNP, VBG, chest x-ray.  Likely will place in observation for stress and echo if workup is negative. 53-year-old female with past medical history of diabetes on insulin, presenting to the ED with shortness of breath for the past week and chest comfort that started today.  Hemodynamically stable.  Physical exam with heart regular rate and rhythm, lungs clear to auscultation, abdomen soft nondistended nontender.  Concern for ACS, low suspicion for possible PE.  Will get labs including CBC, CMP, coags, D-dimer, troponin, ECG, BNP, VBG, chest x-ray.  Likely will place in observation for stress and echo if workup is negative.    INGRID Oliver MD: Agree with resident/ACP MDM, assessment and plan as above.

## 2024-05-27 NOTE — ED ADULT NURSE NOTE - DISTAL EXTREMITY COLOR
Unable to retrieve patient chart and identify care due.  [facility] Embedded Care Due Messages. Reference number: [id number].   5/14/2024 4:05:33 PM CDT   color consistent with ethnicity/race

## 2024-05-27 NOTE — ED CDU PROVIDER DISPOSITION NOTE - CLINICAL COURSE
52 yo F with a PMH of IDDM on pump p/w shortness of breath for the past week and chest comfort that started today.  Patient states that she has been having exertional dyspnea has been going on for a week. Today she began to experience diffuse chest tightness while at rest. Concerned that her father has had prior stents. Pt herself has no hx of CAD/MI. Denies nausea, vomiting, fever, chills, palpitations, leg swelling, calf pain, headache, dizziness.   In the ED, pt with stable VS. Labs non actionable, trop negative x 1. EKG non ischemic. CXR w/o acute pathology. Plan for CDU for cntd cardiac w/u including CTC and ECHO.  In the CDU*** 54 yo F with a PMH of IDDM on pump p/w shortness of breath for the past week and chest comfort that started today.  Patient states that she has been having exertional dyspnea has been going on for a week. Today she began to experience diffuse chest tightness while at rest. Concerned that her father has had prior stents. Pt herself has no hx of CAD/MI. Denies nausea, vomiting, fever, chills, palpitations, leg swelling, calf pain, headache, dizziness.   In the ED, pt with stable VS. Labs non actionable, trop negative x 1. EKG non ischemic. CXR w/o acute pathology. Plan for CDU for cntd cardiac w/u including CTC and ECHO.  In the CDU no adverse events on tele. TTE with no wall motion abnormalities and normal LV systolic function.  Noted interatrial septum finding of "stretched and displaced to the right, consistent with left atrial volume overload".  CTC with calcium score of 0, noted scattered minimal stenosis secondary to noncalcified plaque but no significant moderate to severe CAD. Patient was evaluated by cardiology who reviewed results of all testing and cleared for discharge home from cardiology standpoint. Case d/w ED attending Dr. Oliver who evaluated pt, stable for discharge home.

## 2024-05-27 NOTE — ED ADULT NURSE NOTE - OBJECTIVE STATEMENT
54 yo F presents to ED A+Ox3 c/o shortness of breath. Patient reports 1 week of shortness of breath both while at rest and exertion, reports "feeling winded and like I ran." States this morning she began having chest discomfort across the chest. Breathing spontaneous and unlabored on room air. Speaking in full sentences without difficulty. Skin warm pink and dry. Denies fever, chills, abdominal pain, back pain, arm pain. EKG completed, NSR on cardiac monitor.

## 2024-05-28 ENCOUNTER — RESULT REVIEW (OUTPATIENT)
Age: 54
End: 2024-05-28

## 2024-05-28 VITALS
DIASTOLIC BLOOD PRESSURE: 49 MMHG | SYSTOLIC BLOOD PRESSURE: 113 MMHG | OXYGEN SATURATION: 99 % | TEMPERATURE: 98 F | HEART RATE: 54 BPM | RESPIRATION RATE: 16 BRPM

## 2024-05-28 DIAGNOSIS — E78.5 HYPERLIPIDEMIA, UNSPECIFIED: ICD-10-CM

## 2024-05-28 DIAGNOSIS — Z96.41 PRESENCE OF INSULIN PUMP (EXTERNAL) (INTERNAL): ICD-10-CM

## 2024-05-28 DIAGNOSIS — I10 ESSENTIAL (PRIMARY) HYPERTENSION: ICD-10-CM

## 2024-05-28 DIAGNOSIS — E10.9 TYPE 1 DIABETES MELLITUS WITHOUT COMPLICATIONS: ICD-10-CM

## 2024-05-28 LAB
A1C WITH ESTIMATED AVERAGE GLUCOSE RESULT: 5.8 % — HIGH (ref 4–5.6)
CHOLEST SERPL-MCNC: 209 MG/DL — HIGH
ESTIMATED AVERAGE GLUCOSE: 120 MG/DL — HIGH (ref 68–114)
HDLC SERPL-MCNC: 82 MG/DL — SIGNIFICANT CHANGE UP
LIPID PNL WITH DIRECT LDL SERPL: 108 MG/DL — HIGH
NON HDL CHOLESTEROL: 127 MG/DL — SIGNIFICANT CHANGE UP
TRIGL SERPL-MCNC: 109 MG/DL — SIGNIFICANT CHANGE UP

## 2024-05-28 PROCEDURE — 93356 MYOCRD STRAIN IMG SPCKL TRCK: CPT

## 2024-05-28 PROCEDURE — 93306 TTE W/DOPPLER COMPLETE: CPT

## 2024-05-28 PROCEDURE — 75574 CT ANGIO HRT W/3D IMAGE: CPT | Mod: MC

## 2024-05-28 PROCEDURE — 80053 COMPREHEN METABOLIC PANEL: CPT

## 2024-05-28 PROCEDURE — 84484 ASSAY OF TROPONIN QUANT: CPT

## 2024-05-28 PROCEDURE — 85014 HEMATOCRIT: CPT

## 2024-05-28 PROCEDURE — 83880 ASSAY OF NATRIURETIC PEPTIDE: CPT

## 2024-05-28 PROCEDURE — 84702 CHORIONIC GONADOTROPIN TEST: CPT

## 2024-05-28 PROCEDURE — 99255 IP/OBS CONSLTJ NEW/EST HI 80: CPT

## 2024-05-28 PROCEDURE — 84295 ASSAY OF SERUM SODIUM: CPT

## 2024-05-28 PROCEDURE — 85025 COMPLETE CBC W/AUTO DIFF WBC: CPT

## 2024-05-28 PROCEDURE — 82947 ASSAY GLUCOSE BLOOD QUANT: CPT

## 2024-05-28 PROCEDURE — 80061 LIPID PANEL: CPT

## 2024-05-28 PROCEDURE — 85730 THROMBOPLASTIN TIME PARTIAL: CPT

## 2024-05-28 PROCEDURE — 99285 EMERGENCY DEPT VISIT HI MDM: CPT | Mod: 25

## 2024-05-28 PROCEDURE — 82330 ASSAY OF CALCIUM: CPT

## 2024-05-28 PROCEDURE — 93306 TTE W/DOPPLER COMPLETE: CPT | Mod: 26

## 2024-05-28 PROCEDURE — 82962 GLUCOSE BLOOD TEST: CPT

## 2024-05-28 PROCEDURE — 83605 ASSAY OF LACTIC ACID: CPT

## 2024-05-28 PROCEDURE — 82435 ASSAY OF BLOOD CHLORIDE: CPT

## 2024-05-28 PROCEDURE — 84132 ASSAY OF SERUM POTASSIUM: CPT

## 2024-05-28 PROCEDURE — 85018 HEMOGLOBIN: CPT

## 2024-05-28 PROCEDURE — 85379 FIBRIN DEGRADATION QUANT: CPT

## 2024-05-28 PROCEDURE — 82803 BLOOD GASES ANY COMBINATION: CPT

## 2024-05-28 PROCEDURE — 36415 COLL VENOUS BLD VENIPUNCTURE: CPT

## 2024-05-28 PROCEDURE — 99238 HOSP IP/OBS DSCHRG MGMT 30/<: CPT

## 2024-05-28 PROCEDURE — 75574 CT ANGIO HRT W/3D IMAGE: CPT | Mod: 26,MC

## 2024-05-28 PROCEDURE — 83036 HEMOGLOBIN GLYCOSYLATED A1C: CPT

## 2024-05-28 PROCEDURE — 80048 BASIC METABOLIC PNL TOTAL CA: CPT

## 2024-05-28 PROCEDURE — 71046 X-RAY EXAM CHEST 2 VIEWS: CPT

## 2024-05-28 PROCEDURE — 93005 ELECTROCARDIOGRAM TRACING: CPT | Mod: XU

## 2024-05-28 PROCEDURE — G0378: CPT

## 2024-05-28 PROCEDURE — 85610 PROTHROMBIN TIME: CPT

## 2024-05-28 RX ORDER — INSULIN ASPART 100 [IU]/ML
1 INJECTION, SOLUTION SUBCUTANEOUS
Refills: 0 | Status: DISCONTINUED | OUTPATIENT
Start: 2024-05-28 | End: 2024-05-31

## 2024-05-28 RX ORDER — SODIUM CHLORIDE 9 MG/ML
1000 INJECTION, SOLUTION INTRAVENOUS
Refills: 0 | Status: DISCONTINUED | OUTPATIENT
Start: 2024-05-28 | End: 2024-05-31

## 2024-05-28 RX ORDER — GLUCAGON INJECTION, SOLUTION 0.5 MG/.1ML
1 INJECTION, SOLUTION SUBCUTANEOUS ONCE
Refills: 0 | Status: DISCONTINUED | OUTPATIENT
Start: 2024-05-28 | End: 2024-05-31

## 2024-05-28 RX ORDER — ACETAMINOPHEN 500 MG
975 TABLET ORAL ONCE
Refills: 0 | Status: COMPLETED | OUTPATIENT
Start: 2024-05-28 | End: 2024-05-28

## 2024-05-28 RX ORDER — DEXTROSE 50 % IN WATER 50 %
12.5 SYRINGE (ML) INTRAVENOUS ONCE
Refills: 0 | Status: DISCONTINUED | OUTPATIENT
Start: 2024-05-28 | End: 2024-05-31

## 2024-05-28 RX ORDER — DEXTROSE 50 % IN WATER 50 %
15 SYRINGE (ML) INTRAVENOUS ONCE
Refills: 0 | Status: DISCONTINUED | OUTPATIENT
Start: 2024-05-28 | End: 2024-05-31

## 2024-05-28 RX ORDER — DEXTROSE 50 % IN WATER 50 %
25 SYRINGE (ML) INTRAVENOUS ONCE
Refills: 0 | Status: DISCONTINUED | OUTPATIENT
Start: 2024-05-28 | End: 2024-05-31

## 2024-05-28 RX ORDER — DEXTROSE 10 % IN WATER 10 %
125 INTRAVENOUS SOLUTION INTRAVENOUS ONCE
Refills: 0 | Status: DISCONTINUED | OUTPATIENT
Start: 2024-05-28 | End: 2024-05-31

## 2024-05-28 RX ADMIN — Medication 975 MILLIGRAM(S): at 12:42

## 2024-05-28 NOTE — ED CDU PROVIDER SUBSEQUENT DAY NOTE - PROGRESS NOTE DETAILS
CDU PROGRESS NOTE ZACHERY MARIANO: c/d/w Cardiologist Dr. Herrera, agree w/ plan CTC. Pt currently at Echo. Patient reassessed at bedside after returning from echocardiogram.  Feels well at this time without complaints.  Heart rate currently 59 sinus rhythm on telemetry.  Patient updated on plan for CT of coronary arteries.  She reports she has a relative bringing extra insulin pump as she is due for exchange tonight, however informed her will likely need to remove for CT, will d/w CT tech and endo prior to testing. - Rory Marshall PA-C INGRID Oliver MD: PT reassessed this AM, is resting comfortably, no complaints. Awaiting results of echo and to have CTCA this AM. Will continue to observe and monitor. Patient was just transported to CT scan.  CT tech made aware patient has insulin pump and pump was removed by patient, she has replacement pump with her and is due for change today anyway and plans to reattach new pump upon return.  I paged endocrinology attending Dr. Killian regarding this and I am waiting return call at this time. - Rory Marshall PA-C Patient returned from CT, Dr. Killian was aware patient removed pump and is okay with patient replacing pump at this time with her new pump.  Patient aware and pump at bedside to be replaced now by patient. - Rory Marshall PA-C TTE with no wall motion abnormalities and normal LV systolic function.  Noted interatrial septum finding of "stretched and displaced to the right, consistent with left atrial volume overload".  CTC with calcium score of 0, noted scattered minimal stenosis secondary to noncalcified plaque but no significant moderate to severe CAD.  I reviewed results with NP cardiology EHSAN Hernandez over phone including interatrial finding as above who will discuss with cardiologist Dr. Herrera. Pending final cards recs at this time. No events on tele. Mild residual HA s/p CTC from nitro, received PO tylenol. - Rory Marshall PA-C Received return call from EHSAN Hernandez who reviewed results w/ Dr. Herrera, they advised from their standpoint patient stable for discharge home, no further cardiac workup at this time, can follow-up outpatient.  Discussed all lab and imaging results with patient at bedside including cholesterol and A1c levels.  Patient feels comfortable with plan for discharge home at this time.  Advised on strict return precautions, stable for discharge.  ED attending Dr. Oliver evaluated patient and is agreeable with discharge and outpt f/u. All questions answered.  - Rory Marshall PA-C

## 2024-05-28 NOTE — ED CDU PROVIDER SUBSEQUENT DAY NOTE - CLINICAL SUMMARY MEDICAL DECISION MAKING FREE TEXT BOX
INGRID Oliver MD:  52 yo F with a PMH of IDDM on pump p/w shortness of breath for the past week and chest comfort that started today.  Patient states that she has been having exertional dyspnea has been going on for a week. Today she began to experience diffuse chest tightness while at rest. Concerned that her father has had prior stents. Pt herself has no hx of CAD/MI. Denies nausea, vomiting, fever, chills, palpitations, leg swelling, calf pain, headache, dizziness. Seen in ED, had neg troponins, non-actionable EKG. Sent to CDU for CTCA and echo.

## 2024-05-28 NOTE — ED ADULT NURSE REASSESSMENT NOTE - NS ED NURSE REASSESS COMMENT FT1
1056 Per CT scan patient given Metoprolol 20mg and Nitro SL 0.8mg bp 108/60 hr 60.
Patient sitting up in stretcher in no acute distress. Breathing spontaneous and unlabored on room air. Skin warm pink and dry. Pending dispo.
Pt prefers to maintain Insulin pump on her own while observed in CDU. PT given attestation form to complete regarding risks and benefits of maintaining pump. RN left message for Endocrinology consult in the event pt is admitted to hospital.
RN spoke to endocrinology regarding pump. Endocrinology team aware.
Received report from SIDNEY Villafuerte RN. Patient presenting with chest discomfort. Notes no discomfort at present with mild SOB. Patient sinus on cardiac monitor. SpO2 99%. Pt placed in position of comfort. Pt educated on call bell system and provided call bell. Bed in lowest position, wheels locked, appropriate side rails raised. Pt denies needs at this time.
Pt received from JASPER Villafuerte. Pt A&O X4, oriented to CDU & plan of care discussed. Pt is observed in CDU for chest discomfort and SOB, Plan for Echo and CT coronaries. Pt continue to have mild chest discomfort and SOB, O2 sat high 90's on RA. Pt denies dizziness or palpitations. V/S stable, IV 20G Rt hand patent and free of signs of infiltration. Pt OOB independently. Pt has Insulin pump on, attestation form signed, Pt able to manage her pump. Safety & comfort measures maintained. Call bell in reach. Will continue to monitor.

## 2024-05-28 NOTE — ED CDU PROVIDER SUBSEQUENT DAY NOTE - PHYSICAL EXAMINATION
CONSTITUTIONAL: Well appearing and in no apparent distress.  ENT: Airway patent, moist mucous membranes.   EYES: Pupils equal, round and reactive to light. EOMI. Conjunctiva normal appearing.   CARDIAC: Normal rate, regular rhythm.  Heart sounds S1, S2.    RESPIRATORY: Breath sounds clear and equal bilaterally.   GASTROINTESTINAL: Abdomen soft, non-tender, not distended.  MUSCULOSKELETAL: Spine appears normal. No LE swelling.   NEUROLOGICAL: Alert and oriented x3, no focal deficits, no motor or sensory deficits. 5/5 muscle strength throughout.  SKIN: Skin normal color, warm, dry and intact.   PSYCHIATRIC: Normal mood and affect.

## 2024-05-28 NOTE — ADVANCED PRACTICE NURSE CONSULT - REASON FOR CONSULT
Pt seen for insulin pump assessment. Pt has insulin pump Omnipod 5 on left flank and Dexcom G6 on right  arm. Pt has supplies  for one week at bedside.  Pt uses Novolog  insulin and endocrinologist is Dr. Janina Richardson.  Pt seen for insulin pump assessment. Pt has insulin pump Omnipod 5 on left flank and Dexcom G6 on right  arm. Pt has supplies  for one week at bedside.  Pt uses Novolog  insulin and endocrinologist is Dr. Janina Vela.

## 2024-05-28 NOTE — CONSULT NOTE ADULT - TIME BILLING
Patient seen and examined, agree with the above assessment and plan by EHSAN Hernandez.  54 yo F with a PMH of IDDM on pump p/w shortness of breath for the past week and chest comfort    #Atypical chest pain   -associated with SOB   -no chf on exam   -ecg with no ischemic changes   -HS trop neg   -chest xray clear   -no events on tele   -echo with nl lV sys tolic EF 55%, no WMA.  no pericardial effusion, trace MR  -ddimer neg   -ct cors    #SOB  -no chf on exam   -cardiac work up as above     dvt ppx     can dc home from [facility]

## 2024-05-28 NOTE — CONSULT NOTE ADULT - SUBJECTIVE AND OBJECTIVE BOX
CARDIOLOGY CONSULT - Dr. Herrera         HPI:  54 yo F with a PMH of IDDM on pump p/w shortness of breath for the past week and chest comfort .  Patient states that she has been having exertional dyspnea has been going on for a week. She first thought it was r/t anxiety. However she began to experience diffuse chest tightness while at rest which prompt her to get evaluated . Concerned that her father has had prior stents. She denies any hx of CAD/MI. She denies any le edema, palps, dizziness. NO recent travel or sickness. Her last stress test was many years ago reported to be normal. Denies hx of asthma, smoking, or seasonal allergies. ROS otherwise negative       PAST MEDICAL & SURGICAL HISTORY:  Type 1 diabetes mellitus  16 Spoke with patient. Patient plans to reduce basal rates by 25% as per endocrine clearance then night before procedure. Insulin pump orders written. Note:  1 unit will lower this patient 50 mg/dL (ISF-Insulin sensitivity factor)      S/P  section              PREVIOUS DIAGNOSTIC TESTING:    [ ] Echocardiogram:  [ ]  Catheterization:  [ ] Stress Test:  	    MEDICATIONS:  Home Medications:  HumaLOG Cartridge:  subcutaneous   Insulin PUmp (2017 06:40)      MEDICATIONS  (STANDING):  dextrose 10% Bolus 125 milliLiter(s) IV Bolus once  dextrose 5%. 1000 milliLiter(s) (50 mL/Hr) IV Continuous <Continuous>  dextrose 5%. 1000 milliLiter(s) (100 mL/Hr) IV Continuous <Continuous>  dextrose 50% Injectable 25 Gram(s) IV Push once  dextrose 50% Injectable 12.5 Gram(s) IV Push once  glucagon  Injectable 1 milliGRAM(s) IntraMuscular once      FAMILY HISTORY:  FH: heart disease (Father)        SOCIAL HISTORY:    [x ] Non-smoker  [ ] Smoker  [ ] Alcohol    Allergies    Cipro (Unknown)  penicillin (Unknown)  sulfa drugs (Unknown)    Intolerances    	    REVIEW OF SYSTEMS:  CONSTITUTIONAL: No fever, weight loss, or fatigue  EYES: No eye pain, visual disturbances, or discharge  ENMT:  No difficulty hearing, tinnitus, vertigo; No sinus or throat pain  NECK: No pain or stiffness  RESPIRATORY: No cough, wheezing, chills or hemoptysis; + Shortness of Breath  CARDIOVASCULAR: + chest pain, palpitations NO passing out, dizziness, or leg swelling  GASTROINTESTINAL: No abdominal or epigastric pain. No nausea, vomiting, or hematemesis; No diarrhea or constipation. No melena or hematochezia.  GENITOURINARY: No dysuria, frequency, hematuria, or incontinence  NEUROLOGICAL: No headaches, memory loss, loss of strength, numbness, or tremors  SKIN: No itching, burning, rashes, or lesions   	    [x ] All others negative	  [ ] Unable to obtain    PHYSICAL EXAM:  T(C): 36.6 (24 @ 07:58), Max: 36.8 (24 @ 17:01)  HR: 52 (24 @ 07:58) (52 - 77)  BP: 110/58 (24 @ 07:58) (99/63 - 120/65)  RR: 18 (24 @ 07:58) (13 - 18)  SpO2: 99% (24 @ 07:58) (97% - 100%)  Wt(kg): --  I&O's Summary      Appearance: Normal	  Psychiatry: A & O x 3, Mood & affect appropriate  HEENT:   Normal oral mucosa, PERRL, EOMI	  Lymphatic: No lymphadenopathy  Cardiovascular: Normal S1 S2,RRR, No JVD, No murmurs  Respiratory: Lungs clear to auscultation	  Gastrointestinal:  Soft, Non-tender, + BS	  Skin: No rashes, No ecchymoses, No cyanosis	  Neurologic: Non-focal  Extremities: Normal range of motion, No clubbing, cyanosis or edema  Vascular: Peripheral pulses palpable 2+ bilaterally    TELEMETRY: nsr 	    ECG:  	nsr hr 75  RADIOLOGY:  < from: TTE W or WO Ultrasound Enhancing Agent (24 @ 06:36) >     CONCLUSIONS:      1. Left ventricular cavity is normal in size. Left ventricular wall thickness is normal. Left ventricular systolic function is normal with an ejection fraction of 55 % by 3D. There are no regional wall motion abnormalities seen.   2. Left ventricular global longitudinal strain is -21.6 % which is normal (< -18%). Images were acquired on a [employer] ultrasound system and processed using [employer] strain analysis software with a heart rate of 54 bpm and a blood pressure of 99/63 mmHg.   3. Normal left ventricular diastolic function, with normal filling pressure.   4. Normal right ventricular cavity size, with normal wall thickness, and normal systolic function.   5. Normal left and right atrial size.   6. No pericardial effusion seen.   7. Trace tricuspid regurgitation.   8. Estimated pulmonary artery systolic pressure is 15 mmHg.   9. No prior echocardiogram is available for comparison.    < end of copied text >    < from: Xray Chest 2 Views PA/Lat (24 @ 14:23) >    The heart is normal in size.  The lungs are clear.  There is no pneumothorax or pleural effusion.  No acute bony abnormality.    IMPRESSION:  Clear lungs.    --- End of Report ---    < end of copied text >    OTHER: 	  	  LABS:	 	    CARDIAC MARKERS:  Troponin T, High Sensitivity Result: <6 ng/L ( @ 18:46)  Troponin T, High Sensitivity Result: <6 ng/L ( @ 14:20)                                  14.1   5.70  )-----------( 311      ( 27 May 2024 14:20 )             42.1         140  |  109<H>  |  16  ----------------------------<  129<H>  3.9   |  23  |  0.78    Ca    9.1      27 May 2024 18:46    TPro  7.5  /  Alb  4.3  /  TBili  0.6  /  DBili  x   /  AST  57<H>  /  ALT  25  /  AlkPhos  66      PT/INR - ( 27 May 2024 14:20 )   PT: 9.7 sec;   INR: 0.88 ratio         PTT - ( 27 May 2024 14:20 )  PTT:30.7 sec  proBNP:   Lipid Profile:   HgA1c:   TSH:

## 2024-05-28 NOTE — ED CDU PROVIDER SUBSEQUENT DAY NOTE - HISTORY
CDU PROGRESS NOTE ZACHERY MARIANO: Pt resting comfortably, NAD, VSS. No events on telemetry. Will continue to monitor, discuss w/ Cards for further recs - CT coronary vs Nuclear stress

## 2024-05-28 NOTE — CONSULT NOTE ADULT - ASSESSMENT
53-year-old female with past medical history of T1DM presenting to the ED with shortness of breath for the past week and chest comfort. Currently in CDU for further cardiac evaluation. Endocrine consulted for T1DM and insulin pump management.    #DM1   #Insulin pump in place  A1c 5.8%   egfr 92   current regimen: omnipod 5 w/ fiasp insulin and Dexcom g6 cgm   Recommendations:   - Continue Home insulin pump on current settings   - FS Goal 100-180   - Nursing staff should check FS premeal and QHS & Enter into Pump Flowsheet the amount of insulin bolus & carb intake   - Ensure Attestation Form & Self assessment forms are Complete   - If pump becomes detached or if patient is unable to manage pump, please give STAT Lantus units SC and remove Insulin pump 2 hours after administration of Lantus. At this time, would then start Admelog  units TIDAC with low dose correctional scale. Please inform endocrine if this occurs.   - Patient due for site change on 5/28 & every 2-3 days there after   - Patient does currently have her pump supplies with her    Discharge Planning:   Plan to resume Insulin pump on discharge. Patient to follow up with outpatient endocrinologist Dr. Janina Vela after discharge.     #HTN  Recommendations:   - outpt BP goal < 130/80   - defer to primary team   - outpatient annual urinary microalb/cr ratio    #HLD  Recommendations:   - LDL goal < 70   - defer to primary team   - outpatient fasting lipid profile       Estefany Killian DO   Attending Physician   Department of Endocrinology, Diabetes and Metabolism     If before 9AM or after 5PM, or on weekends/holidays, please call the Endocrine answering service for assistance ([phone]).  For nonurgent matters, please email [email or web address] or [email or web address]     Please note that this patient may be followed by different provider tomorrow.  Notify endocrine 24 hours prior to discharge for final recommendations   53-year-old female with past medical history of T1DM presenting to the ED with shortness of breath for the past week and chest comfort. Currently in CDU for further cardiac evaluation. Endocrine consulted for T1DM and insulin pump management.    #DM1   #Insulin pump in place  A1c 5.8%   egfr 92   current regimen: omnipod 5 w/ fiasp insulin and Dexcom g6 cgm   Recommendations:   - Continue Home insulin pump on current settings   - FS Goal 100-180   - Nursing staff should check FS premeal and QHS & Enter into Pump Flowsheet the amount of insulin bolus & carb intake   - Ensure Attestation Form & Self assessment forms are Complete   - Patient due for site change on 5/28 & every 2-3 days there after   - Please inform endocrine if pump becomes disconnected or if patient unable to manage her pump on her own as she will need stat Lantus and pump removal in 2 hours in this case.   - Patient does currently have her pump supplies with her  - Patient expected to be discharged from CDU today per team     Discharge Planning:   Plan to resume Insulin pump on discharge. Patient to follow up with outpatient endocrinologist Dr. Janina Vela after discharge.     #HTN  Recommendations:   - outpt BP goal < 130/80   - defer to primary team   - outpatient annual urinary microalb/cr ratio    #HLD  Recommendations:   - LDL goal < 70   - defer to primary team   - outpatient fasting lipid profile     recs communicated with primary team     Estefany Killian DO   Attending Physician   Department of Endocrinology, Diabetes and Metabolism     If before 9AM or after 5PM, or on weekends/holidays, please call the Endocrine answering service for assistance ([phone]).  For nonurgent matters, please email [email or web address] or [email or web address]     Please note that this patient may be followed by different provider tomorrow.  Notify endocrine 24 hours prior to discharge for final recommendations

## 2024-05-28 NOTE — ADVANCED PRACTICE NURSE CONSULT - RECOMMEDATIONS
Pt aox4 able to mange with insulin pump independently.  Will  continue with insulin pump. Endocrine team to follow.

## 2024-05-28 NOTE — CONSULT NOTE ADULT - ASSESSMENT
54 yo F with a PMH of IDDM on pump p/w shortness of breath for the past week and chest comfort    #Atypical chest pain   -associated with SOB   -no chf on exam   -ecg with no ischemic changes   -HS trop neg   -chest xray clear   -no events on tele   -echo with nl lV sys tolic EF 55%, no WMA.  no pericardial effusion, trace MR  -ddimer neg   -FU cta cors     #SOB  -no chf on exam   -cardiac work up as above     dvt ppx

## 2024-05-28 NOTE — CONSULT NOTE ADULT - SUBJECTIVE AND OBJECTIVE BOX
ENDOCRINE INITIAL CONSULT - DM1/insulin pump     HPI:  53-year-old female with past medical history of T1DM presenting to the ED with shortness of breath for the past week and chest comfort. Currently in CDU for further cardiac evaluation. Endocrine consulted for T1DM and insulin pump management.    --Type of DM: 1   --Diagnosed at age:    --Endocrinologist: Dr. Janina Vela   --Type of pump: Omnipod 5 with automode   --Type of Insulin used in pump: Fiasp U-100   --CGM? Dexcom G6   --Is pt comfortable on pump while in the hospital? Yes   --Last time pump was changed? 24 - next due to change is 24   --Do you have supplies? Not at the moment   --Any plans for surgery? Denies       PAST MEDICAL & SURGICAL HISTORY:  Type 1 diabetes mellitus        S/P  section          FAMILY HISTORY:  FH: heart disease (Father)        Social History:      Home Medications:  Fiasp insulin via Omnipod 5 insulin pump      MEDICATIONS  (STANDING):  dextrose 10% Bolus 125 milliLiter(s) IV Bolus once  dextrose 5%. 1000 milliLiter(s) (100 mL/Hr) IV Continuous <Continuous>  dextrose 5%. 1000 milliLiter(s) (50 mL/Hr) IV Continuous <Continuous>  dextrose 50% Injectable 25 Gram(s) IV Push once  dextrose 50% Injectable 12.5 Gram(s) IV Push once  glucagon  Injectable 1 milliGRAM(s) IntraMuscular once    MEDICATIONS  (PRN):  dextrose Oral Gel 15 Gram(s) Oral once PRN Blood Glucose LESS THAN 70 milliGRAM(s)/deciliter      Allergies    Cipro (Unknown)  penicillin (Unknown)  sulfa drugs (Unknown)    Intolerances        REVIEW OF SYSTEMS  Constitutional: No fever  Eyes: No blurry vision  Neuro: No tremors  HEENT: No pain  Cardiovascular: No chest pain, palpitations  Respiratory: No SOB, no cough  GI: No nausea, vomiting, abdominal pain  : No dysuria  Skin: no rash  Psych: no depression  Endocrine: no polyuria, polydipsia  Hem/lymph: no swelling  Osteoporosis: no fractures  ALL OTHER SYSTEMS REVIEWED AND NEGATIVE       PHYSICAL EXAM   Vital Signs Last 24 Hrs  T(C): 36.6 (28 May 2024 07:58), Max: 36.8 (27 May 2024 17:01)  T(F): 97.9 (28 May 2024 07:58), Max: 98.3 (27 May 2024 20:00)  HR: 52 (28 May 2024 07:58) (52 - 77)  BP: 110/58 (28 May 2024 07:58) (99/63 - 120/65)  BP(mean): 72 (28 May 2024 07:58) (72 - 78)  RR: 18 (28 May 2024 07:58) (13 - 18)  SpO2: 99% (28 May 2024 07:58) (97% - 100%)    Parameters below as of 28 May 2024 07:58  Patient On (Oxygen Delivery Method): room air      GENERAL: NAD, well-groomed, well-developed  EYES: No proptosis, no lid lag, anicteric  HEENT:  Atraumatic, Normocephalic, moist mucous membranes  THYROID: Normal size, no palpable nodules  RESPIRATORY: Clear to auscultation bilaterally; No rales, rhonchi, wheezing  CARDIOVASCULAR: Regular rate and rhythm; No murmurs; no peripheral edema  GI: Soft, nontender, non distended, normal bowel sounds  SKIN: Dry, intact, No rashes or lesions  MUSCULOSKELETAL: Full range of motion, normal strength  NEURO: sensation intact, extraocular movements intact, no tremor  PSYCH: Alert and oriented x 3, normal affect, normal mood  CUSHING'S SIGNS: no striae    CAPILLARY BLOOD GLUCOSE      POCT Blood Glucose.: 113 mg/dL (28 May 2024 02:08)      A1C with Estimated Average Glucose Result: 5.8 % (- @ 18:46)                            14.1   5.70  )-----------( 311      ( 27 May 2024 14:20 )             42.1           140  |  109<H>  |  16  ----------------------------<  129<H>  3.9   |  23  |  0.78    Ca    9.1      27 May 2024 18:46    TPro  7.5  /  Alb  4.3  /  TBili  0.6  /  DBili  x   /  AST  57<H>  /  ALT  25  /  AlkPhos  66            LIPIDS    RADIOLOGY ENDOCRINE INITIAL CONSULT - DM1/insulin pump     HPI:  53-year-old female with past medical history of T1DM presenting to the ED with shortness of breath for the past week and chest comfort. Currently in CDU for further cardiac evaluation. Endocrine consulted for T1DM and insulin pump management.    --Type of DM: 1   --Diagnosed at age:    --Endocrinologist: Dr. Janina Vela   --Type of pump: Omnipod 5 with automode - has been on this pump for ~ 8 months   --Type of Insulin used in pump: Fiasp U-100   --CGM? Dexcom G6   --Is pt comfortable on pump while in the hospital? Yes   --Last time pump was changed? 24   --Do you have supplies? yes     Pump settings:   12am 0.55   4am 0.8  3pm 0.85  9pm 0.7    ISF  12am 40  8:30 60    ICR 12am 7, 9:30 7, 8pm 8  AIT 2 hours   BG target     PAST MEDICAL & SURGICAL HISTORY:  Type 1 diabetes mellitus        S/P  section          FAMILY HISTORY:  FH: heart disease (Father)        Social History: does not report any toxic habits.       Home Medications:  Fiasp insulin via Omnipod 5 insulin pump      MEDICATIONS  (STANDING):  dextrose 10% Bolus 125 milliLiter(s) IV Bolus once  dextrose 5%. 1000 milliLiter(s) (100 mL/Hr) IV Continuous <Continuous>  dextrose 5%. 1000 milliLiter(s) (50 mL/Hr) IV Continuous <Continuous>  dextrose 50% Injectable 25 Gram(s) IV Push once  dextrose 50% Injectable 12.5 Gram(s) IV Push once  glucagon  Injectable 1 milliGRAM(s) IntraMuscular once    MEDICATIONS  (PRN):  dextrose Oral Gel 15 Gram(s) Oral once PRN Blood Glucose LESS THAN 70 milliGRAM(s)/deciliter      Allergies    Cipro (Unknown)  penicillin (Unknown)  sulfa drugs (Unknown)    Intolerances        REVIEW OF SYSTEMS  Constitutional: No fever  Eyes: No blurry vision  Neuro: No tremors  HEENT: No pain  Cardiovascular: No chest pain, palpitations  Respiratory: No SOB, no cough  GI: No nausea, vomiting, abdominal pain  : No dysuria  Skin: no rash  Psych: no depression  Endocrine: no polyuria, polydipsia  Hem/lymph: no swelling  Osteoporosis: no fractures  ALL OTHER SYSTEMS REVIEWED AND NEGATIVE       PHYSICAL EXAM   Vital Signs Last 24 Hrs  T(C): 36.6 (28 May 2024 07:58), Max: 36.8 (27 May 2024 17:01)  T(F): 97.9 (28 May 2024 07:58), Max: 98.3 (27 May 2024 20:00)  HR: 52 (28 May 2024 07:58) (52 - 77)  BP: 110/58 (28 May 2024 07:58) (99/63 - 120/65)  BP(mean): 72 (28 May 2024 07:58) (72 - 78)  RR: 18 (28 May 2024 07:58) (13 - 18)  SpO2: 99% (28 May 2024 07:58) (97% - 100%)    Parameters below as of 28 May 2024 07:58  Patient On (Oxygen Delivery Method): room air      GENERAL: NAD, well-groomed, well-developed  EYES: No proptosis, no lid lag, anicteric  HEENT:  Atraumatic, Normocephalic, moist mucous membranes  THYROID: Normal size, no palpable nodules  RESPIRATORY: Nonlabored respirations on room air, normal rate, normal effort   CARDIOVASCULAR: Regular rate and rhythm;no peripheral edema  GI: Soft, nontender, non distended  SKIN: Dry, intact, No rashes or lesions; + RUE Dexcom CGM, +L. lower back Omnipod   MUSCULOSKELETAL: Full range of motion, normal strength  NEURO: sensation intact, extraocular movements intact, no tremor  PSYCH: Alert and oriented x 3, reactive affect, reactive mood  CUSHING'S SIGNS: no striae    CAPILLARY BLOOD GLUCOSE      POCT Blood Glucose.: 113 mg/dL (28 May 2024 02:08)      A1C with Estimated Average Glucose Result: 5.8 % (- @ 18:46)                            14.1   5.70  )-----------( 311      ( 27 May 2024 14:20 )             42.1           140  |  109<H>  |  16  ----------------------------<  129<H>  3.9   |  23  |  0.78    Ca    9.1      27 May 2024 18:46    TPro  7.5  /  Alb  4.3  /  TBili  0.6  /  DBili  x   /  AST  57<H>  /  ALT  25  /  AlkPhos  66  05-27          LIPIDS    RADIOLOGY

## 2024-05-28 NOTE — ADVANCED PRACTICE NURSE CONSULT - ASSESSMENT
53  year old female  s/p SOB . Pt has history of pymr1LC, who has insulin pump for glucose management Endocrinologist  will follow for diabetes management.    Basal Rate                                            ICR                                                             ISF  12am-4am                                    12am-9:30am                                                12am-8:30pm       0.55                                                   7                                                               40  4am-3pm                                       9:30am-8pm                                                 8:30pm-12am        0.8                                                    7                                                               60  3pm-10pm                                      8pm-12am        0.85                                                  8  10pm-12am         0.7    TDD:18.35    Target Glucose: 110    Duration: 2

## 2025-06-08 ENCOUNTER — NON-APPOINTMENT (OUTPATIENT)
Age: 55
End: 2025-06-08

## 2025-08-05 ENCOUNTER — APPOINTMENT (OUTPATIENT)
Dept: ORTHOPEDIC SURGERY | Facility: CLINIC | Age: 55
End: 2025-08-05
Payer: COMMERCIAL

## 2025-08-05 VITALS — BODY MASS INDEX: 23.32 KG/M2 | WEIGHT: 140 LBS | HEIGHT: 65 IN

## 2025-08-05 DIAGNOSIS — M77.11 LATERAL EPICONDYLITIS, RIGHT ELBOW: ICD-10-CM

## 2025-08-05 PROCEDURE — 99203 OFFICE O/P NEW LOW 30 MIN: CPT | Mod: 25

## 2025-08-05 PROCEDURE — 20551 NJX 1 TENDON ORIGIN/INSJ: CPT | Mod: RT

## 2025-08-05 PROCEDURE — J3490M: CUSTOM
